# Patient Record
Sex: FEMALE | Race: WHITE | Employment: UNEMPLOYED | URBAN - METROPOLITAN AREA
[De-identification: names, ages, dates, MRNs, and addresses within clinical notes are randomized per-mention and may not be internally consistent; named-entity substitution may affect disease eponyms.]

---

## 2020-06-23 ENCOUNTER — HOSPITAL ENCOUNTER (EMERGENCY)
Age: 35
Discharge: HOME | End: 2020-06-23
Attending: EMERGENCY MEDICINE
Payer: OTHER GOVERNMENT

## 2020-06-23 ENCOUNTER — APPOINTMENT (OUTPATIENT)
Dept: GENERAL RADIOLOGY | Age: 35
End: 2020-06-23
Attending: EMERGENCY MEDICINE
Payer: OTHER GOVERNMENT

## 2020-06-23 VITALS
TEMPERATURE: 98.7 F | DIASTOLIC BLOOD PRESSURE: 88 MMHG | BODY MASS INDEX: 29.03 KG/M2 | SYSTOLIC BLOOD PRESSURE: 122 MMHG | RESPIRATION RATE: 14 BRPM | WEIGHT: 185 LBS | HEART RATE: 99 BPM | OXYGEN SATURATION: 100 % | HEIGHT: 67 IN

## 2020-06-23 DIAGNOSIS — S16.1XXA STRAIN OF NECK MUSCLE, INITIAL ENCOUNTER: Primary | ICD-10-CM

## 2020-06-23 DIAGNOSIS — S29.011A INTERCOSTAL MUSCLE STRAIN, INITIAL ENCOUNTER: ICD-10-CM

## 2020-06-23 PROCEDURE — 74011250637 HC RX REV CODE- 250/637: Performed by: EMERGENCY MEDICINE

## 2020-06-23 PROCEDURE — 99283 EMERGENCY DEPT VISIT LOW MDM: CPT

## 2020-06-23 PROCEDURE — 71046 X-RAY EXAM CHEST 2 VIEWS: CPT

## 2020-06-23 RX ORDER — LIDOCAINE 50 MG/G
PATCH TOPICAL
Qty: 15 EACH | Refills: 0 | Status: SHIPPED | OUTPATIENT
Start: 2020-06-23

## 2020-06-23 RX ORDER — ACETAMINOPHEN 500 MG
1000 TABLET ORAL
Qty: 30 TAB | Refills: 0 | Status: SHIPPED | OUTPATIENT
Start: 2020-06-23

## 2020-06-23 RX ORDER — IBUPROFEN 400 MG/1
400 TABLET ORAL
Qty: 20 TAB | Refills: 0 | Status: SHIPPED | OUTPATIENT
Start: 2020-06-23

## 2020-06-23 RX ORDER — NAPROXEN 250 MG/1
500 TABLET ORAL
Status: COMPLETED | OUTPATIENT
Start: 2020-06-23 | End: 2020-06-23

## 2020-06-23 RX ORDER — ACETAMINOPHEN 500 MG
1000 TABLET ORAL ONCE
Status: COMPLETED | OUTPATIENT
Start: 2020-06-23 | End: 2020-06-23

## 2020-06-23 RX ADMIN — ACETAMINOPHEN 1000 MG: 500 TABLET ORAL at 06:45

## 2020-06-23 RX ADMIN — NAPROXEN 500 MG: 250 TABLET ORAL at 06:45

## 2020-06-23 NOTE — ED PROVIDER NOTES
EMERGENCY DEPARTMENT HISTORY AND PHYSICAL EXAM    Date: 6/23/2020  Patient Name: Giovanna Luis    History of Presenting Illness     Chief Complaint   Patient presents with    Shoulder Pain    Neck Pain         History Provided By: Patient    Elyssa Arvizu is a 37 y.o. female  who presents to the emergency department C/O left neck pain and left subcostal pain. 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 She states that yesterday started developing left anterior neck and shoulder pain. She states that she is also developed left lower chest wall pain that is worse with deep inspiration. No cough no shortness of breath no leg swelling no OCPs and no PE risk factors. Pain is reproducible on palpation and movement. No sick contacts. Patient states she googled her symptoms and they told her she might be having a heart attack so she came to emergency department      PCP: Richard, MD Anish        Past History     Past Medical History:  History reviewed. No pertinent past medical history. Past Surgical History:  History reviewed. No pertinent surgical history. Family History:  History reviewed. No pertinent family history. Social History:  Social History     Tobacco Use    Smoking status: Current Every Day Smoker    Smokeless tobacco: Never Used   Substance Use Topics    Alcohol use: Yes    Drug use: Not Currently       Allergies:  No Known Allergies      Review of Systems   Review of Systems   Constitutional: Negative for chills and fever. Respiratory: Negative for apnea, cough, chest tightness, shortness of breath and wheezing. Cardiovascular: Positive for chest pain (chest wall). Gastrointestinal: Negative for constipation, nausea and vomiting. Musculoskeletal: Positive for myalgias and neck pain. All other systems reviewed and are negative.         Physical Exam     Vitals:    06/23/20 0618   BP: 122/88   Pulse: 99 Resp: 14   Temp: 98.7 °F (37.1 °C)   SpO2: 100%   Weight: 83.9 kg (185 lb)   Height: 5' 7\" (1.702 m)     Physical Exam  Constitutional:       Appearance: Normal appearance. HENT:      Head: Normocephalic and atraumatic. Eyes:      Extraocular Movements: Extraocular movements intact. Conjunctiva/sclera: Conjunctivae normal.   Neck:      Musculoskeletal: Normal range of motion and neck supple. Muscular tenderness present. No neck rigidity. Thyroid: No thyroid mass or thyromegaly. Vascular: No JVD. Trachea: Trachea and phonation normal.     Cardiovascular:      Rate and Rhythm: Normal rate and regular rhythm. Pulses: Normal pulses. Heart sounds: Normal heart sounds. Pulmonary:      Effort: Pulmonary effort is normal. No respiratory distress. Breath sounds: Normal breath sounds. No stridor. No wheezing. Chest:      Chest wall: Tenderness present. There is no dullness to percussion. Comments: Tenderness along left costal margin, skin normal  Musculoskeletal: Normal range of motion. Lymphadenopathy:      Cervical: No cervical adenopathy. Skin:     General: Skin is warm and dry. Neurological:      General: No focal deficit present. Mental Status: She is alert and oriented to person, place, and time. Mental status is at baseline. Psychiatric:         Mood and Affect: Mood normal.         Behavior: Behavior normal.             Diagnostic Study Results     Labs -   No results found for this or any previous visit (from the past 12 hour(s)). Radiologic Studies -   XR CHEST PA LAT    (Results Pending)     CT Results  (Last 48 hours)    None        CXR Results  (Last 48 hours)    None          Medications given in the ED-  Medications   acetaminophen (TYLENOL) tablet 1,000 mg (1,000 mg Oral Given 6/23/20 0645)   naproxen (NAPROSYN) tablet 500 mg (500 mg Oral Given 6/23/20 0645)         Medical Decision Making   I am the first provider for this patient.     I reviewed the vital signs, available nursing notes, past medical history, past surgical history, family history and social history. Vital Signs-Reviewed the patient's vital signs. Pulse Oximetry Analysis - 100% on RA normal           Records Reviewed: Nursing Notes    Provider Notes (Medical Decision Making): Ramiro Servin is a 37 y.o. female here with left neck and chest wall pain. Pain is along the distribution of the vacuum  patient was wearing. Symptoms are reproducible on palpation. No red flag symptoms. Lungs clear. No PE risk factors. PERC negative. Pain is MSK in nature. Procedures:  Procedures    ED Course:   6:51 AM  No acute findings on chest x-ray. Will treat symptomatically for musculoskeletal pain. Work note provided. I have cautioned patient if her symptoms get worse, change in nature, she develops any difficulty breathing she should return directly to the emergency department    Diagnosis and Disposition     Critical Care:     DISCHARGE NOTE:    Karmenzi Reyes  results have been reviewed with her. She has been counseled regarding her diagnosis, treatment, and plan. She verbally conveys understanding and agreement of the signs, symptoms, diagnosis, treatment and prognosis and additionally agrees to follow up as discussed. She also agrees with the care-plan and conveys that all of her questions have been answered. I have also provided discharge instructions for her that include: educational information regarding their diagnosis and treatment, and list of reasons why they would want to return to the ED prior to their follow-up appointment, should her condition change. She has been provided with education for proper emergency department utilization. CLINICAL IMPRESSION:    1. Strain of neck muscle, initial encounter    2. Intercostal muscle strain, initial encounter        PLAN:  1. D/C Home  2.    Current Discharge Medication List      START taking these medications Details   acetaminophen (TYLENOL) 500 mg tablet Take 2 Tabs by mouth every eight (8) hours as needed for Pain. Qty: 30 Tab, Refills: 0      ibuprofen (MOTRIN) 400 mg tablet Take 1 Tab by mouth every six (6) hours as needed for Pain. Qty: 20 Tab, Refills: 0      lidocaine (Lidoderm) 5 % Apply patch to the affected area for 12 hours a day and remove for 12 hours a day. Qty: 15 Each, Refills: 0           3. Follow-up Information     Follow up With Specialties Details Why [address]    [facility] EMERGENCY DEPT Emergency Medicine Go to  If symptoms worsen [address] [id number] [phone]    Your primary care doctor  Schedule an appointment as soon as possible for a visit  For primary care follow up         _______________________________      Please note that this dictation was completed with [employer], the computer voice recognition software. Quite often unanticipated grammatical, syntax, homophones, and other interpretive errors are inadvertently transcribed by the computer software. Please disregard these errors. Please excuse any errors that have escaped final proofreading.

## 2020-06-23 NOTE — LETTER
NOTIFICATION RETURN TO WORK / SCHOOL 
 
6/23/2020 6:54 AM 
 
Ms. Flavia Moreira No address on file. To Whom It May Concern: 
 
Flavia Moreira is currently under the care of [facility] EMERGENCY DEPT. She will return to work/school on: 6/25/20 If there are questions or concerns please have the patient contact our office.  
 
 
 
Sincerely, 
 
 
Adriana Corbett MD

## 2020-06-23 NOTE — LETTER
NOTIFICATION RETURN TO WORK / SCHOOL 
 
6/23/2020 6:56 AM 
 
Ms. Donita Hobbs [address] [id number] To Whom It May Concern: 
 
Donita Hobbs is currently under the care of [facility] EMERGENCY DEPT. She will return to work/school on: 6/24/20 If there are questions or concerns please have the patient contact our office.  
 
 
 
Sincerely, 
 
 
Claudia Cheema MD

## 2020-06-23 NOTE — ED TRIAGE NOTES
Pt arrives ambulatory to ED with c\o LEFT shoulder and neck pain, pt sts she also has LEFT flank pain that is reproducible with deep inhalation, pt is able to make needs known speaking in complete sentences, pt in nad at this time

## 2021-02-08 ENCOUNTER — HOSPITAL ENCOUNTER (EMERGENCY)
Age: 36
Discharge: OTHER INSTITUTION | End: 2021-02-08
Attending: EMERGENCY MEDICINE
Payer: OTHER GOVERNMENT

## 2021-02-08 VITALS
WEIGHT: 185 LBS | RESPIRATION RATE: 18 BRPM | TEMPERATURE: 98.5 F | BODY MASS INDEX: 28.98 KG/M2 | OXYGEN SATURATION: 100 % | SYSTOLIC BLOOD PRESSURE: 149 MMHG | DIASTOLIC BLOOD PRESSURE: 79 MMHG | HEART RATE: 109 BPM

## 2021-02-08 DIAGNOSIS — O20.0 THREATENED MISCARRIAGE IN EARLY PREGNANCY: Primary | ICD-10-CM

## 2021-02-08 DIAGNOSIS — N76.0 BV (BACTERIAL VAGINOSIS): ICD-10-CM

## 2021-02-08 DIAGNOSIS — B96.89 BV (BACTERIAL VAGINOSIS): ICD-10-CM

## 2021-02-08 LAB
ABO + RH BLD: NORMAL
BASOPHILS # BLD: 0 K/UL (ref 0–0.1)
BASOPHILS NFR BLD: 0 % (ref 0–2)
DIFFERENTIAL METHOD BLD: NORMAL
EOSINOPHIL # BLD: 0.1 K/UL (ref 0–0.4)
EOSINOPHIL NFR BLD: 2 % (ref 0–5)
ERYTHROCYTE [DISTWIDTH] IN BLOOD BY AUTOMATED COUNT: 12.3 % (ref 11.6–14.5)
HCG SERPL-ACNC: 141 MIU/ML (ref 0–10)
HCT VFR BLD AUTO: 43.6 % (ref 35–45)
HGB BLD-MCNC: 15 G/DL (ref 12–16)
LYMPHOCYTES # BLD: 3 K/UL (ref 0.9–3.6)
LYMPHOCYTES NFR BLD: 39 % (ref 21–52)
MCH RBC QN AUTO: 32 PG (ref 24–34)
MCHC RBC AUTO-ENTMCNC: 34.4 G/DL (ref 31–37)
MCV RBC AUTO: 93 FL (ref 74–97)
MONOCYTES # BLD: 0.5 K/UL (ref 0.05–1.2)
MONOCYTES NFR BLD: 6 % (ref 3–10)
NEUTS SEG # BLD: 4 K/UL (ref 1.8–8)
NEUTS SEG NFR BLD: 53 % (ref 40–73)
PLATELET # BLD AUTO: 321 K/UL (ref 135–420)
PMV BLD AUTO: 10.2 FL (ref 9.2–11.8)
RBC # BLD AUTO: 4.69 M/UL (ref 4.2–5.3)
SERVICE CMNT-IMP: NORMAL
WBC # BLD AUTO: 7.6 K/UL (ref 4.6–13.2)
WET PREP GENITAL: NORMAL

## 2021-02-08 PROCEDURE — 86900 BLOOD TYPING SEROLOGIC ABO: CPT

## 2021-02-08 PROCEDURE — 99282 EMERGENCY DEPT VISIT SF MDM: CPT

## 2021-02-08 PROCEDURE — 99281 EMR DPT VST MAYX REQ PHY/QHP: CPT

## 2021-02-08 PROCEDURE — 85025 COMPLETE CBC W/AUTO DIFF WBC: CPT

## 2021-02-08 PROCEDURE — 87210 SMEAR WET MOUNT SALINE/INK: CPT

## 2021-02-08 PROCEDURE — 87491 CHLMYD TRACH DNA AMP PROBE: CPT

## 2021-02-08 PROCEDURE — 84702 CHORIONIC GONADOTROPIN TEST: CPT

## 2021-02-08 RX ORDER — METRONIDAZOLE 500 MG/1
500 TABLET ORAL 2 TIMES DAILY
Qty: 14 TAB | Refills: 0 | Status: SHIPPED | OUTPATIENT
Start: 2021-02-08 | End: 2021-02-15

## 2021-02-08 NOTE — ED PROVIDER NOTES
EMERGENCY DEPARTMENT HISTORY AND PHYSICAL EXAM    Date: 2021  Patient Name: Flavia Moreira    History of Presenting Illness     Chief Complaint   Patient presents with    Abdominal Pain         History Provided By: Patient    Flavia Moreira is a 37 y.o. female  A2 suspected early pregnancy with no PMHX who presents to the emergency department C/O pelvic cramping. Associated sxs include vaginal spotting. She reports LMP 2020 + home UPT a week ago. Patient states that today started with some pelvic cramping and some vaginal spotting when using restroom today. She states that she called her primary care doctor who suggested coming here for further evaluation. She has upcoming OB/GYN appointment however has not been seen yet. Pt denies fever, vomiting, dysuria, and any other sxs or complaints. PCP: Anish Ramos MD    Current Outpatient Medications   Medication Sig Dispense Refill    metroNIDAZOLE (FlagyL) 500 mg tablet Take 1 Tab by mouth two (2) times a day for 7 days. 14 Tab 0    acetaminophen (TYLENOL) 500 mg tablet Take 2 Tabs by mouth every eight (8) hours as needed for Pain. 30 Tab 0    ibuprofen (MOTRIN) 400 mg tablet Take 1 Tab by mouth every six (6) hours as needed for Pain. 20 Tab 0    lidocaine (Lidoderm) 5 % Apply patch to the affected area for 12 hours a day and remove for 12 hours a day. 15 Each 0       Past History     Past Medical History:  No past medical history on file. Past Surgical History:  No past surgical history on file. Family History:  No family history on file. Social History:  Social History     Tobacco Use    Smoking status: Current Every Day Smoker    Smokeless tobacco: Never Used   Substance Use Topics    Alcohol use: Yes    Drug use: Not Currently       Allergies:  No Known Allergies      Review of Systems   Review of Systems   Constitutional: Negative for fever. Gastrointestinal: Negative for vomiting.    Genitourinary: Positive for pelvic pain and vaginal bleeding. Negative for dysuria. All other systems reviewed and are negative. Physical Exam     Vitals:    02/08/21 1024   BP: (!) 149/79   Pulse: (!) 109   Resp: 18   Temp: 98.5 °F (36.9 °C)   SpO2: 100%   Weight: 83.9 kg (185 lb)     Physical Exam  Vitals signs and nursing note reviewed. Exam conducted with a chaperone present. Constitutional:       General: She is not in acute distress. Appearance: She is well-developed and normal weight. HENT:      Head: Normocephalic and atraumatic. Cardiovascular:      Rate and Rhythm: Normal rate. Pulmonary:      Effort: Pulmonary effort is normal.   Abdominal:      Palpations: Abdomen is soft. Tenderness: There is no abdominal tenderness. Genitourinary:     Exam position: Lithotomy position. Vagina: Vaginal discharge present. No bleeding. Cervix: No cervical motion tenderness. Uterus: Not tender. Adnexa:         Right: No tenderness. Left: No tenderness. Skin:     General: Skin is warm and dry. Capillary Refill: Capillary refill takes less than 2 seconds. Neurological:      General: No focal deficit present. Mental Status: She is alert and oriented to person, place, and time.    Psychiatric:         Mood and Affect: Mood normal.         Behavior: Behavior normal.       Diagnostic Study Results     Labs -     Recent Results (from the past 12 hour(s))   BETA HCG, QT    Collection Time: 02/08/21 11:25 AM   Result Value Ref Range    Beta HCG,  (H) 0 - 10 MIU/ML   BLOOD TYPE, (ABO+RH)    Collection Time: 02/08/21 11:25 AM   Result Value Ref Range    ABO/Rh(D) O POSITIVE    CBC WITH AUTOMATED DIFF    Collection Time: 02/08/21 11:25 AM   Result Value Ref Range    WBC 7.6 4.6 - 13.2 K/uL    RBC 4.69 4.20 - 5.30 M/uL    HGB 15.0 12.0 - 16.0 g/dL    HCT 43.6 35.0 - 45.0 %    MCV 93.0 74.0 - 97.0 FL    MCH 32.0 24.0 - 34.0 PG    MCHC 34.4 31.0 - 37.0 g/dL    RDW 12.3 11.6 - 14.5 %    PLATELET 055 808 - 420 K/uL    MPV 10.2 9.2 - 11.8 FL    NEUTROPHILS 53 40 - 73 %    LYMPHOCYTES 39 21 - 52 %    MONOCYTES 6 3 - 10 %    EOSINOPHILS 2 0 - 5 %    BASOPHILS 0 0 - 2 %    ABS. NEUTROPHILS 4.0 1.8 - 8.0 K/UL    ABS. LYMPHOCYTES 3.0 0.9 - 3.6 K/UL    ABS. MONOCYTES 0.5 0.05 - 1.2 K/UL    ABS. EOSINOPHILS 0.1 0.0 - 0.4 K/UL    ABS. BASOPHILS 0.0 0.0 - 0.1 K/UL    DF AUTOMATED     WET PREP    Collection Time: 21 11:50 AM    Specimen: Vagina   Result Value Ref Range    Special Requests: NO SPECIAL REQUESTS      Wet prep NO YEAST SEEN      Wet prep NO TRICHOMONAS SEEN      Wet prep CLUE CELLS PRESENT         Radiologic Studies -   No orders to display     CT Results  (Last 48 hours)    None        CXR Results  (Last 48 hours)    None          Medications given in the ED-  Medications - No data to display      Medical Decision Making   I am the first provider for this patient. I reviewed the vital signs, available nursing notes, past medical history, past surgical history, family history and social history. Vital Signs-Reviewed the patient's vital signs. Pulse Oximetry Analysis - 100% on RA     Records Reviewed: Nursing Notes    Procedures:  Pelvic Exam    Date/Time: 2021 11:39 AM  Performed by: PA  Type of exam performed: bimanual and speculum. External genitalia appearance: normal.    Vaginal exam:  discharge. The amount of discharge was:  mild. The discharge was white. Cervical exam:  no cervical motion tenderness and os closed. Specimen(s) collected:  chlamydia and GC. Bimanual exam:  normal.    Patient tolerance: patient tolerated the procedure well with no immediate complications          ED Course:   10:30 AM   Initial assessment performed. The patients presenting problems have been discussed, and they are in agreement with the care plan formulated and outlined with them. I have encouraged them to ask questions as they arise throughout their visit.     Discussion: 37 y.o. female A2 suspected early pregnancy with positive UPT at home complaining of some pelvic cramping and vaginal spotting today. Patient Quincy Adams very low only less than 200, O+, + BV, stable CBC. GC chlamydia culture pending. Plan for repeat quant in 2 days. Strict return precautions discussed. Diagnosis and Disposition       DISCHARGE NOTE:  Sonia Edmonds  results have been reviewed with her. She has been counseled regarding her diagnosis, treatment, and plan. She verbally conveys understanding and agreement of the signs, symptoms, diagnosis, treatment and prognosis and additionally agrees to follow up as discussed. She also agrees with the care-plan and conveys that all of her questions have been answered. I have also provided discharge instructions for her that include: educational information regarding their diagnosis and treatment, and list of reasons why they would want to return to the ED prior to their follow-up appointment, should her condition change. She has been provided with education for proper emergency department utilization. CLINICAL IMPRESSION:    1. Threatened miscarriage in early pregnancy    2. BV (bacterial vaginosis)        PLAN:  1. D/C Home  2. Current Discharge Medication List      START taking these medications    Details   metroNIDAZOLE (FlagyL) 500 mg tablet Take 1 Tab by mouth two (2) times a day for 7 days. Qty: 14 Tab, Refills: 0           3. Follow-up Information     Follow up With Specialties Details Why [address]    [facility] EMERGENCY DEPT Emergency Medicine In 2 days for repeat HCG [address] [id number] [phone]                  Please note that this dictation was completed with [employer], the computer voice recognition software. Quite often unanticipated grammatical, syntax, homophones, and other interpretive errors are inadvertently transcribed by the computer software. Please disregard these errors.   Please excuse any errors that have escaped final proofreading.

## 2021-02-08 NOTE — ED TRIAGE NOTES
Patient arrived to Ed with c/o vaginal spotting and cramping.  Patient states has had 3 positive home pregnancy test but has not seen OB yet unsure how far

## 2021-02-08 NOTE — ED NOTES
Pt c/o vaginal bleeding (spotting) and cramping.  Pt states she has had positive pregnancy tests, has not seen an OBGYN yet, and does not know her blood type

## 2021-02-10 LAB
C TRACH RRNA SPEC QL NAA+PROBE: NEGATIVE
N GONORRHOEA RRNA SPEC QL NAA+PROBE: NEGATIVE
SPECIMEN SOURCE: NORMAL